# Patient Record
Sex: FEMALE
[De-identification: names, ages, dates, MRNs, and addresses within clinical notes are randomized per-mention and may not be internally consistent; named-entity substitution may affect disease eponyms.]

---

## 2020-01-24 ENCOUNTER — APPOINTMENT (OUTPATIENT)
Dept: ENDOCRINOLOGY | Facility: CLINIC | Age: 40
End: 2020-01-24
Payer: MEDICAID

## 2020-01-24 VITALS
OXYGEN SATURATION: 97 % | TEMPERATURE: 98.1 F | DIASTOLIC BLOOD PRESSURE: 91 MMHG | BODY MASS INDEX: 34.55 KG/M2 | SYSTOLIC BLOOD PRESSURE: 126 MMHG | WEIGHT: 183 LBS | HEIGHT: 61 IN | HEART RATE: 81 BPM | RESPIRATION RATE: 15 BRPM

## 2020-01-24 DIAGNOSIS — E66.9 OBESITY, UNSPECIFIED: ICD-10-CM

## 2020-01-24 DIAGNOSIS — R63.5 ABNORMAL WEIGHT GAIN: ICD-10-CM

## 2020-01-24 DIAGNOSIS — R73.03 PREDIABETES.: ICD-10-CM

## 2020-01-24 DIAGNOSIS — E83.52 HYPERCALCEMIA: ICD-10-CM

## 2020-01-24 DIAGNOSIS — Z83.3 FAMILY HISTORY OF DIABETES MELLITUS: ICD-10-CM

## 2020-01-24 PROBLEM — Z00.00 ENCOUNTER FOR PREVENTIVE HEALTH EXAMINATION: Status: ACTIVE | Noted: 2020-01-24

## 2020-01-24 PROCEDURE — 99204 OFFICE O/P NEW MOD 45 MIN: CPT

## 2020-01-24 NOTE — HISTORY OF PRESENT ILLNESS
[FreeTextEntry1] : 39 year old female with PMH of pre-DM and obesity presented to establish care for hypercalcemia. \par \par She reports hypercalcemia was noted on routine blood work in 10/2019.  Calcium level was elevated to 10.8 in 10/12/2019 (previously 10.3 in 5/2019). In 10/2019, Vitamin D was low to 10.7 with elevated PTH of 170.4.  As per patient, the Ca has never exceeded 11.5. \par \par She reports history of nephrolithiasis. These were detected due to recurrent abdominal pain. She was seen by urologist but did not require any intervention. She was recommended to increase PO hydration. \par \par She denies family history of hypercalcemia. \par She denies history of bone fracture. \par Patient is no longer taking vitamin D supplement. Last supplement was taken in 10/2019 following results of blood work. She completed 4 weeks of ergocalciferol 08111 IU once weekly. \par \par She denies taking calcium supplements, vitamin A supplements or thiazide diuretics or lithium. \par \par Patient denies weakness, fatigue, foggy mentation, bone pain, vomiting, constipation, cardiac arrhythmias or depression. \par \par Patient reports no history of hyperthyroidism or Multiple myeloma. \par \par She reports weight gain of 30 lbs in the last 1 year without significant change in diet or exercise. TSH in 10/2019 was within normal limits. She also noted mild hirsutism along the jaw line. She denies violaceous stretch marks or change in face/back. \par \par Patient's preferred phone number: [phone]\par

## 2020-01-24 NOTE — REVIEW OF SYSTEMS
[Fatigue] : no fatigue [Decreased Appetite] : appetite not decreased [Recent Weight Gain (___ Lbs)] : recent [unfilled] ~Ulb weight gain [Visual Field Defect] : no visual field defect [Blurry Vision] : no blurred vision [Dysphonia] : no dysphonia [Dysphagia] : no dysphagia [Chest Pain] : no chest pain [Palpitations] : no palpitations [Shortness Of Breath] : no shortness of breath [Wheezing] : no wheezing was heard [Cough] : no cough [Nausea] : no nausea [Vomiting] : no vomiting was observed [Constipation] : no constipation [Dysuria] : no dysuria [Diarrhea] : no diarrhea [Incontinence] : no incontinence [Hair Loss] : no hair loss [Dry Skin] : no dry skin [Headache] : no headaches [Tremors] : no tremors [Dizziness] : no dizziness [Depression] : no depression [Anxiety] : no anxiety [Cold Intolerance] : cold tolerant [Heat Intolerance] : heat tolerant [Swelling] : no swelling [Lymphadenopathy] : no lymphadenopathy

## 2020-01-24 NOTE — PHYSICAL EXAM
[Alert] : alert [No Acute Distress] : no acute distress [Well Developed] : well developed [Well Nourished] : well nourished [EOMI] : extra ocular movement intact [Normal Sclera/Conjunctiva] : normal sclera/conjunctiva [Normal Oropharynx] : the oropharynx was normal [No Proptosis] : no proptosis [Thyroid Not Enlarged] : the thyroid was not enlarged [No Thyroid Nodules] : there were no palpable thyroid nodules [No Respiratory Distress] : no respiratory distress [Clear to Auscultation] : lungs were clear to auscultation bilaterally [No Accessory Muscle Use] : no accessory muscle use [Normal Rate] : heart rate was normal  [Normal S1, S2] : normal S1 and S2 [Regular Rhythm] : with a regular rhythm [Pedal Pulses Normal] : the pedal pulses are present [No Edema] : there was no peripheral edema [Normal Bowel Sounds] : normal bowel sounds [Not Tender] : non-tender [Not Distended] : not distended [Soft] : abdomen soft [No Spinal Tenderness] : no spinal tenderness [Spine Straight] : spine straight [Normal Gait] : normal gait [Normal Strength/Tone] : muscle strength and tone were normal [No Rash] : no rash [Normal Reflexes] : deep tendon reflexes were 2+ and symmetric [No Tremors] : no tremors [Oriented x3] : oriented to person, place, and time [Acne] : no acne [Abdominal Striae] : abdominal striae [Hirsutism] : hirsutism [Acanthosis Nigricans] : no acanthosis nigricans [de-identified] : normal appearing stretch marks, light in color

## 2020-01-24 NOTE — CONSULT LETTER
[Dear  ___] : Dear ~GEORGIE, [FreeTextEntry1] : \par I had the pleasure of seeing your patient, MATI PABLO, in consultation today for hypercalcemia.\par I am including my consult note for your review. If I can be of any assistance, please do not hesitate to contact me. Thank you for allowing me to participate in the care of your patient.\par  \par Sincerely, \par \par Gabi Hogue M.D.\par \par

## 2020-01-24 NOTE — ASSESSMENT
[FreeTextEntry1] : 1. Hypercalcemia with elevated PTH\par -likely secondary to primary hyperparathyroidism though will rule out FHH\par -will repeat vitamin D, calcium, and PTH now\par -if vitamin D replete, will proceed with 24 hour urine collection for calcium and creatinine\par -If primary hyperparathyroidism is confirmed may proceed with sestamibi scan and or neck ultrasound as patient is a surgical candidate given age <50 and history of nephrolithiasis \par \par 2. Pre-DM\par -recent a1c 5.7$\par - targets for weight and A1c have been discussed with pt \par -Exercise and healthy eating has been discussed with the pt and its importance in the management of pre- diabetes\par \par 3, Weight gain\par -will repeat TFTs now\par -Expressed concern for Cushing' disease to patient. The patient would like to hold off on testing for this condition until hypercalcemia has been addressed. Will proceed with Dexamethasone suppression test when patient is amenable.  \par \par 4. Obesity\par -BMI 34.5\par -counseled on diet and exercise

## 2020-01-27 LAB
25(OH)D3 SERPL-MCNC: 11.6 NG/ML
ANION GAP SERPL CALC-SCNC: 11 MMOL/L
BUN SERPL-MCNC: 15 MG/DL
CALCIUM SERPL-MCNC: 11.3 MG/DL
CALCIUM SERPL-MCNC: 11.3 MG/DL
CHLORIDE SERPL-SCNC: 103 MMOL/L
CO2 SERPL-SCNC: 25 MMOL/L
CREAT SERPL-MCNC: 0.8 MG/DL
GLUCOSE SERPL-MCNC: 88 MG/DL
PARATHYROID HORMONE INTACT: 95 PG/ML
POTASSIUM SERPL-SCNC: 5.2 MMOL/L
SODIUM SERPL-SCNC: 139 MMOL/L
T4 FREE SERPL-MCNC: 1.1 NG/DL
TSH SERPL-ACNC: 3.07 UIU/ML

## 2020-04-06 ENCOUNTER — APPOINTMENT (OUTPATIENT)
Dept: ENDOCRINOLOGY | Facility: CLINIC | Age: 40
End: 2020-04-06